# Patient Record
Sex: MALE | Race: WHITE | NOT HISPANIC OR LATINO | Employment: FULL TIME | URBAN - METROPOLITAN AREA
[De-identification: names, ages, dates, MRNs, and addresses within clinical notes are randomized per-mention and may not be internally consistent; named-entity substitution may affect disease eponyms.]

---

## 2021-01-26 ENCOUNTER — IMMUNIZATION (OUTPATIENT)
Dept: PEDIATRICS | Facility: CLINIC | Age: 31
End: 2021-01-26
Payer: COMMERCIAL

## 2021-01-26 PROCEDURE — 91300 PR COVID VAC PFIZER DIL RECON 30 MCG/0.3 ML IM: CPT

## 2021-01-26 PROCEDURE — 0001A PR COVID VAC PFIZER DIL RECON 30 MCG/0.3 ML IM: CPT

## 2021-02-16 ENCOUNTER — IMMUNIZATION (OUTPATIENT)
Dept: PEDIATRICS | Facility: CLINIC | Age: 31
End: 2021-02-16
Attending: INTERNAL MEDICINE
Payer: COMMERCIAL

## 2021-02-16 PROCEDURE — 0002A PR COVID VAC PFIZER DIL RECON 30 MCG/0.3 ML IM: CPT

## 2021-02-16 PROCEDURE — 91300 PR COVID VAC PFIZER DIL RECON 30 MCG/0.3 ML IM: CPT

## 2021-03-07 ENCOUNTER — HEALTH MAINTENANCE LETTER (OUTPATIENT)
Age: 31
End: 2021-03-07

## 2021-10-11 ENCOUNTER — HEALTH MAINTENANCE LETTER (OUTPATIENT)
Age: 31
End: 2021-10-11

## 2022-03-27 ENCOUNTER — HEALTH MAINTENANCE LETTER (OUTPATIENT)
Age: 32
End: 2022-03-27

## 2022-09-25 ENCOUNTER — HEALTH MAINTENANCE LETTER (OUTPATIENT)
Age: 32
End: 2022-09-25

## 2022-10-24 ENCOUNTER — IMMUNIZATION (OUTPATIENT)
Dept: FAMILY MEDICINE | Facility: CLINIC | Age: 32
End: 2022-10-24
Payer: COMMERCIAL

## 2022-10-24 DIAGNOSIS — Z23 HIGH PRIORITY FOR 2019-NCOV VACCINE: Primary | ICD-10-CM

## 2022-10-24 PROCEDURE — 0124A COVID-19,PF,PFIZER BOOSTER BIVALENT: CPT

## 2022-10-24 PROCEDURE — 91312 COVID-19,PF,PFIZER BOOSTER BIVALENT: CPT

## 2022-11-17 ENCOUNTER — TELEPHONE (OUTPATIENT)
Dept: FAMILY MEDICINE | Facility: CLINIC | Age: 32
End: 2022-11-17

## 2022-11-17 NOTE — TELEPHONE ENCOUNTER
Hi  Pt has appt scheduled with you on Monday for office visit-scheduled through [facility], but in the note he put that he wants to do a physical. Are you okay with me changing the type to physical even though it is a 20 minute appt? Let me know, thanks!

## 2022-11-18 ASSESSMENT — ENCOUNTER SYMPTOMS
NERVOUS/ANXIOUS: 0
DIARRHEA: 0
JOINT SWELLING: 0
SORE THROAT: 0
DYSURIA: 1
ABDOMINAL PAIN: 0
CHILLS: 0
HEADACHES: 0
DIZZINESS: 0
PALPITATIONS: 0
CONSTIPATION: 0
HEMATOCHEZIA: 0
HEARTBURN: 0
SHORTNESS OF BREATH: 0
PARESTHESIAS: 0
HEMATURIA: 0
EYE PAIN: 0
FEVER: 0
FREQUENCY: 0
COUGH: 0
MYALGIAS: 0
WEAKNESS: 0
ARTHRALGIAS: 0
NAUSEA: 0

## 2022-11-21 ENCOUNTER — OFFICE VISIT (OUTPATIENT)
Dept: FAMILY MEDICINE | Facility: CLINIC | Age: 32
End: 2022-11-21
Payer: COMMERCIAL

## 2022-11-21 VITALS
DIASTOLIC BLOOD PRESSURE: 79 MMHG | HEART RATE: 66 BPM | OXYGEN SATURATION: 99 % | SYSTOLIC BLOOD PRESSURE: 119 MMHG | WEIGHT: 209.4 LBS | RESPIRATION RATE: 13 BRPM | TEMPERATURE: 97.9 F | HEIGHT: 70 IN | BODY MASS INDEX: 29.98 KG/M2

## 2022-11-21 DIAGNOSIS — Z13.220 SCREENING CHOLESTEROL LEVEL: ICD-10-CM

## 2022-11-21 DIAGNOSIS — Z13.1 DIABETES MELLITUS SCREENING: ICD-10-CM

## 2022-11-21 DIAGNOSIS — L80 VITILIGO: ICD-10-CM

## 2022-11-21 DIAGNOSIS — N48.1 BALANITIS: ICD-10-CM

## 2022-11-21 DIAGNOSIS — Z00.00 ROUTINE GENERAL MEDICAL EXAMINATION AT A HEALTH CARE FACILITY: Primary | ICD-10-CM

## 2022-11-21 PROCEDURE — 99385 PREV VISIT NEW AGE 18-39: CPT | Performed by: FAMILY MEDICINE

## 2022-11-21 PROCEDURE — 36415 COLL VENOUS BLD VENIPUNCTURE: CPT | Performed by: FAMILY MEDICINE

## 2022-11-21 PROCEDURE — 82947 ASSAY GLUCOSE BLOOD QUANT: CPT | Performed by: FAMILY MEDICINE

## 2022-11-21 PROCEDURE — 80061 LIPID PANEL: CPT | Performed by: FAMILY MEDICINE

## 2022-11-21 RX ORDER — NYSTATIN AND TRIAMCINOLONE ACETONIDE 100000; 1 [USP'U]/G; MG/G
OINTMENT TOPICAL 2 TIMES DAILY
Qty: 15 G | Refills: 0 | Status: SHIPPED | OUTPATIENT
Start: 2022-11-21 | End: 2024-04-17

## 2022-11-21 ASSESSMENT — ENCOUNTER SYMPTOMS
EYE PAIN: 0
ARTHRALGIAS: 0
SHORTNESS OF BREATH: 0
MYALGIAS: 0
CHILLS: 0
FEVER: 0
CONSTIPATION: 0
DIARRHEA: 0
SORE THROAT: 0
PALPITATIONS: 0
PARESTHESIAS: 0
HEMATOCHEZIA: 0
COUGH: 0
HEMATURIA: 0
ABDOMINAL PAIN: 0
DYSURIA: 1
FREQUENCY: 0
DIZZINESS: 0
NAUSEA: 0
HEADACHES: 0
WEAKNESS: 0
NERVOUS/ANXIOUS: 0
HEARTBURN: 0
JOINT SWELLING: 0

## 2022-11-21 ASSESSMENT — PAIN SCALES - GENERAL: PAINLEVEL: NO PAIN (0)

## 2022-11-21 NOTE — PROGRESS NOTES
SUBJECTIVE:   CC: Ray is an 32 year old who presents for preventative health visit.     Patient has been advised of split billing requirements and indicates understanding: Yes  Healthy Habits:     Getting at least 3 servings of Calcium per day:  Yes    Bi-annual eye exam:  Yes    Dental care twice a year:  NO    Sleep apnea or symptoms of sleep apnea:  None    Diet:  Regular (no restrictions)    Frequency of exercise:  6-7 days/week    Duration of exercise:  45-60 minutes    Taking medications regularly:  Not Applicable    Medication side effects:  Not applicable    PHQ-2 Total Score: 0    Additional concerns today:  No        Today's PHQ-2 Score:   PHQ-2 ( 1999 Pfizer) 11/18/2022   Q1: Little interest or pleasure in doing things 0   Q2: Feeling down, depressed or hopeless 0   PHQ-2 Score 0   Q1: Little interest or pleasure in doing things Not at all   Q2: Feeling down, depressed or hopeless Not at all   PHQ-2 Score 0       Have you ever done Advance Care Planning? (For example, a Health Directive, POLST, or a discussion with a medical provider or your loved ones about your wishes): No, advance care planning information given to patient to review.  Patient declined advance care planning discussion at this time.    Social History     Tobacco Use     Smoking status: Every Day     Types: Other     Smokeless tobacco: Never   Substance Use Topics     Alcohol use: Yes     Comment: 1 -2 drinks a week     If you drink alcohol do you typically have >3 drinks per day or >7 drinks per week? No    Alcohol Use 11/21/2022   Prescreen: >3 drinks/day or >7 drinks/week? -   Prescreen: >3 drinks/day or >7 drinks/week? No       Last PSA: No results found for: PSA    Reviewed orders with patient. Reviewed health maintenance and updated orders accordingly - Yes  Lab work is in process  Labs reviewed in EPIC  BP Readings from Last 3 Encounters:   11/21/22 119/79    Wt Readings from Last 3 Encounters:   11/21/22 95 kg (209 lb 6.4 oz)     "                Reviewed and updated as needed this visit by clinical staff   Tobacco  Allergies  Meds  Problems  Med Hx  Surg Hx  Fam Hx          Reviewed and updated as needed this visit by Provider     Meds  Problems  Med Hx  Surg Hx  Fam Hx         Here today to establish care and for routine checkup.  He notes a few weeks of what he thinks might be balanitis.  Developed some irritation of the skin around the glans of his penis.  Using some over-the-counter antifungal cream and it seems to have kept things in check but not necessarily improving it.  No urinary or ejaculatory symptoms.  Denies any other constitutional symptoms such as polyuria or polydipsia.    Review of Systems   Constitutional: Negative for chills and fever.   HENT: Negative for congestion, ear pain, hearing loss and sore throat.    Eyes: Negative for pain and visual disturbance.   Respiratory: Negative for cough and shortness of breath.    Cardiovascular: Negative for chest pain, palpitations and peripheral edema.   Gastrointestinal: Negative for abdominal pain, constipation, diarrhea, heartburn, hematochezia and nausea.   Genitourinary: Positive for dysuria and genital sores. Negative for frequency, hematuria, impotence, penile discharge and urgency.   Musculoskeletal: Negative for arthralgias, joint swelling and myalgias.   Skin: Negative for rash.   Neurological: Negative for dizziness, weakness, headaches and paresthesias.   Psychiatric/Behavioral: Negative for mood changes. The patient is not nervous/anxious.        OBJECTIVE:   /79   Pulse 66   Temp 97.9  F (36.6  C) (Oral)   Resp 13   Ht 1.778 m (5' 10\")   Wt 95 kg (209 lb 6.4 oz)   SpO2 99%   BMI 30.05 kg/m      Physical Exam  GENERAL: healthy, alert and no distress  EYES: Eyes grossly normal to inspection, PERRL and conjunctivae and sclerae normal  HENT: ear canals and TM's normal, nose and mouth without ulcers or lesions  NECK: no adenopathy, no asymmetry, " masses, or scars and thyroid normal to palpation  RESP: lungs clear to auscultation - no rales, rhonchi or wheezes  CV: regular rate and rhythm, normal S1 S2, no S3 or S4, no murmur, click or rub, no peripheral edema and peripheral pulses strong  ABDOMEN: soft, nontender, no hepatosplenomegaly, no masses and bowel sounds normal   (male): Some patches of hypopigmentation scattered around the distal foreskin and glans.  But there is a sense of generalized irritation more prominent on the underside of the distal foreskin  MS: no gross musculoskeletal defects noted, no edema  SKIN: no suspicious lesions or rashes  NEURO: Normal strength and tone, mentation intact and speech normal  PSYCH: mentation appears normal, affect normal/bright    Diagnostic Test Results:  Labs reviewed in Epic    ASSESSMENT/PLAN:   (Z00.00) Routine general medical examination at a health care facility  (primary encounter diagnosis)  Comment: Routine health maintenance otherwise up-to-date  Plan:     (N48.1) Balanitis  Comment: Short-term trial of combination medicine but I would not want to use this long-term.  Contact me in 1 to 2 weeks with progress  Plan: nystatin-triamcinolone (MYCOLOG) 237907-7.1         UNIT/GM-% external ointment            (L80) Vitiligo  Comment: Stable.  Dermatology if needed.  Plan:     (Z13.220) Screening cholesterol level  Comment:   Plan: Lipid panel reflex to direct LDL Non-fasting            (Z13.1) Diabetes mellitus screening  Comment:   Plan: Glucose              Patient has been advised of split billing requirements and indicates understanding: Yes      COUNSELING:   Reviewed preventive health counseling, as reflected in patient instructions       Regular exercise       Healthy diet/nutrition       Vision screening        He reports that he has been smoking other. He has never used smokeless tobacco.  Nicotine/Tobacco Cessation Plan:   Information offered: Patient not interested at this time            Olivia  Kp Lara MD  [facility]

## 2022-11-22 LAB
CHOLEST SERPL-MCNC: 251 MG/DL
FASTING STATUS PATIENT QL REPORTED: NO
FASTING STATUS PATIENT QL REPORTED: NO
GLUCOSE BLD-MCNC: 112 MG/DL (ref 70–99)
HDLC SERPL-MCNC: 37 MG/DL
LDLC SERPL CALC-MCNC: 165 MG/DL
NONHDLC SERPL-MCNC: 214 MG/DL
TRIGL SERPL-MCNC: 246 MG/DL

## 2022-11-23 NOTE — RESULT ENCOUNTER NOTE
Ray,  Your sugar and cholesterol are mildly elevated - not yet to the point of needing any medication, but I do think we should try to bring them down through diet, exercise, weight loss, etc.  I suggest a diet high in protein and plants (fruits, veggies) and low in simple carbohydrates, combined with an exercise program including both cardio and weight training.  We can recheck on this annually.    IVÁN Lara MD

## 2023-10-23 ENCOUNTER — PATIENT OUTREACH (OUTPATIENT)
Dept: CARE COORDINATION | Facility: CLINIC | Age: 33
End: 2023-10-23
Payer: COMMERCIAL

## 2023-11-06 ENCOUNTER — PATIENT OUTREACH (OUTPATIENT)
Dept: CARE COORDINATION | Facility: CLINIC | Age: 33
End: 2023-11-06
Payer: COMMERCIAL

## 2023-12-23 ENCOUNTER — HEALTH MAINTENANCE LETTER (OUTPATIENT)
Age: 33
End: 2023-12-23

## 2024-04-15 SDOH — HEALTH STABILITY: PHYSICAL HEALTH: ON AVERAGE, HOW MANY DAYS PER WEEK DO YOU ENGAGE IN MODERATE TO STRENUOUS EXERCISE (LIKE A BRISK WALK)?: 7 DAYS

## 2024-04-15 SDOH — HEALTH STABILITY: PHYSICAL HEALTH: ON AVERAGE, HOW MANY MINUTES DO YOU ENGAGE IN EXERCISE AT THIS LEVEL?: 40 MIN

## 2024-04-15 ASSESSMENT — SOCIAL DETERMINANTS OF HEALTH (SDOH): HOW OFTEN DO YOU GET TOGETHER WITH FRIENDS OR RELATIVES?: TWICE A WEEK

## 2024-04-17 ENCOUNTER — OFFICE VISIT (OUTPATIENT)
Dept: FAMILY MEDICINE | Facility: CLINIC | Age: 34
End: 2024-04-17
Payer: COMMERCIAL

## 2024-04-17 VITALS
SYSTOLIC BLOOD PRESSURE: 132 MMHG | WEIGHT: 205 LBS | HEIGHT: 71 IN | OXYGEN SATURATION: 98 % | HEART RATE: 65 BPM | TEMPERATURE: 96.9 F | BODY MASS INDEX: 28.7 KG/M2 | DIASTOLIC BLOOD PRESSURE: 82 MMHG

## 2024-04-17 DIAGNOSIS — E78.5 HYPERLIPIDEMIA WITH TARGET LDL LESS THAN 100: ICD-10-CM

## 2024-04-17 DIAGNOSIS — Z00.00 PREVENTATIVE HEALTH CARE: Primary | ICD-10-CM

## 2024-04-17 DIAGNOSIS — D22.9 BENIGN SKIN MOLE: ICD-10-CM

## 2024-04-17 LAB — HBA1C MFR BLD: 5.1 % (ref 0–5.6)

## 2024-04-17 PROCEDURE — 83036 HEMOGLOBIN GLYCOSYLATED A1C: CPT

## 2024-04-17 PROCEDURE — 90715 TDAP VACCINE 7 YRS/> IM: CPT

## 2024-04-17 PROCEDURE — 90471 IMMUNIZATION ADMIN: CPT

## 2024-04-17 PROCEDURE — 80061 LIPID PANEL: CPT

## 2024-04-17 PROCEDURE — 84460 ALANINE AMINO (ALT) (SGPT): CPT

## 2024-04-17 PROCEDURE — 91320 SARSCV2 VAC 30MCG TRS-SUC IM: CPT

## 2024-04-17 PROCEDURE — 99395 PREV VISIT EST AGE 18-39: CPT | Mod: 25

## 2024-04-17 PROCEDURE — 36415 COLL VENOUS BLD VENIPUNCTURE: CPT

## 2024-04-17 PROCEDURE — 84443 ASSAY THYROID STIM HORMONE: CPT

## 2024-04-17 PROCEDURE — 90472 IMMUNIZATION ADMIN EACH ADD: CPT

## 2024-04-17 PROCEDURE — 80048 BASIC METABOLIC PNL TOTAL CA: CPT

## 2024-04-17 PROCEDURE — 90480 ADMN SARSCOV2 VAC 1/ONLY CMP: CPT

## 2024-04-17 PROCEDURE — 90677 PCV20 VACCINE IM: CPT

## 2024-04-17 NOTE — PROGRESS NOTES
"Preventive Care Visit  [facility] REYNA NOWAK Juju, ARELIS CNP, Nurse Practitioner Primary Care  Apr 17, 2024      Assessment & Plan     Preventative health care  TDAP, COVID and PCV 20 given. Screening labs ordered  - Lipid panel reflex to direct LDL Fasting; Future  - Basic metabolic panel  (Ca, Cl, CO2, Creat, Gluc, K, Na, BUN); Future  - Hemoglobin A1c; Future  - Lipid panel reflex to direct LDL Fasting  - Basic metabolic panel  (Ca, Cl, CO2, Creat, Gluc, K, Na, BUN)  - Hemoglobin A1c    Benign skin mole  Mole to left abdomen <0.5cm in size. Slight Asymmetry but no other concerning features. Appears to be similar to other moles on his body. Discussed possible removal but at this time will defer.     Hyperlipidemia with target LDL less than 100  Elevated cholesterol levels last year. Reports he is fasting. Will recheck lipids today, states he has improved his diet.   - Lipid panel reflex to direct LDL Fasting; Future              BMI  Estimated body mass index is 29 kg/m  as calculated from the following:    Height as of this encounter: 1.791 m (5' 10.5\").    Weight as of this encounter: 93 kg (205 lb).       Counseling  Appropriate preventive services were discussed with this patient, including applicable screening as appropriate for fall prevention, nutrition, physical activity, Tobacco-use cessation, weight loss and cognition.  Checklist reviewing preventive services available has been given to the patient.  Reviewed patient's diet, addressing concerns and/or questions.   The patient was instructed to see the dentist every 6 months.   He is at risk for psychosocial distress and has been provided with information to reduce risk.           Maryann Bell is a 34 year old, presenting for the following:  Physical and Mole        4/17/2024     8:43 AM   Additional Questions   Roomed by jayesh Hansen    Health Care Directive  Patient does not have a Health Care Directive or Living " Will: Discussed advance care planning with patient; information given to patient to review.    HPI  Has his own practice, works as a therapist.   Has two children.    Skin Lesion  Onset/Duration: 9months-1year ago  Description  Location: left upper quad stomach  Color: brown and black  Border description: flat, round  Character: round  Itching: no  Bleeding:  No  Intensity:    Progression of Symptoms:  same  Accompanying signs and symptoms:   Bleeding: No  Scaling: No  Excessive sun exposure/tanning: No- does have vitalago  Sunscreen used: YES  History:           Any previous history of skin cancer: No  Any family history of melanoma: No  Previous episodes of similar lesion: No  Precipitating or alleviating factors:   Therapies tried and outcome: none        4/15/2024   General Health   How would you rate your overall physical health? Good   Feel stress (tense, anxious, or unable to sleep) Only a little   (!) STRESS CONCERN      4/15/2024   Nutrition   Three or more servings of calcium each day? Yes   Diet: Regular (no restrictions)   How many servings of fruit and vegetables per day? (!) 2-3   How many sweetened beverages each day? 0-1   Reduces cholesterol levels       4/15/2024   Exercise   Days per week of moderate/strenous exercise 7 days   Average minutes spent exercising at this level 40 min   Uses peloton       4/15/2024   Social Factors   Frequency of gathering with friends or relatives Twice a week   Worry food won't last until get money to buy more No   Food not last or not have enough money for food? No   Do you have housing?  Yes   Are you worried about losing your housing? No   Lack of transportation? No   Unable to get utilities (heat,electricity)? No         4/15/2024   Dental   Dentist two times every year? (!) NO   Went to the dentist yesterday. States it was the first time he has been since the pandemic.       4/15/2024   TB Screening   Were you born outside of the [place]? No     Today's PHQ-2 Score:  "      4/17/2024     8:39 AM   PHQ-2 ( 1999 Pfizer)   Q1: Little interest or pleasure in doing things 0   Q2: Feeling down, depressed or hopeless 0   PHQ-2 Score 0   Q1: Little interest or pleasure in doing things Not at all   Q2: Feeling down, depressed or hopeless Not at all   PHQ-2 Score 0         4/15/2024   Substance Use   Alcohol more than 3/day or more than 7/wk No   Do you use any other substances recreationally? No     Social History     Tobacco Use    Smoking status: Former     Current packs/day: 0.50     Average packs/day: 0.5 packs/day for 5.0 years (2.5 ttl pk-yrs)     Types: Other, Cigarettes    Smokeless tobacco: Never   Vaping Use    Vaping status: Every Day    Substances: Nicotine    Devices: Refillable tank   Substance Use Topics    Alcohol use: Yes     Comment: 1 -2 drinks a week    Drug use: Never         4/15/2024   STI Screening   New sexual partner(s) since last STI/HIV test? No         4/15/2024   Contraception/Family Planning   Questions about contraception or family planning No        Reviewed and updated as needed this visit by Provider   Tobacco  Allergies  Meds  Problems  Med Hx  Surg Hx  Fam Hx               Objective    Exam  /82   Pulse 65   Temp 96.9  F (36.1  C) (Temporal)   Ht 1.791 m (5' 10.5\")   Wt 93 kg (205 lb)   SpO2 98%   BMI 29.00 kg/m     Estimated body mass index is 29 kg/m  as calculated from the following:    Height as of this encounter: 1.791 m (5' 10.5\").    Weight as of this encounter: 93 kg (205 lb).    Physical Exam  GENERAL: alert and no distress  EYES: Eyes grossly normal to inspection, PERRL and conjunctivae and sclerae normal  HENT: ear canals and TM's normal, nose and mouth without ulcers or lesions  NECK: no adenopathy, no asymmetry, masses, or scars  RESP: lungs clear to auscultation - no rales, rhonchi or wheezes  CV: regular rate and rhythm, normal S1 S2, no S3 or S4, no murmur, click or rub, no peripheral edema  ABDOMEN: soft, nontender, " no hepatosplenomegaly, no masses and bowel sounds normal  MS: no gross musculoskeletal defects noted, no edema  SKIN: left abdominal, left side and mid back mole - all less then 0.5cm, and cyst to back of neck left.   NEURO: Normal strength and tone, mentation intact and speech normal  PSYCH: mentation appears normal, affect normal/bright        Signed Electronically by: ARELIS Castanon CNP

## 2024-04-18 DIAGNOSIS — E78.5 HYPERLIPIDEMIA WITH TARGET LDL LESS THAN 100: Primary | ICD-10-CM

## 2024-04-18 LAB
ALT SERPL W P-5'-P-CCNC: 20 U/L (ref 0–70)
ANION GAP SERPL CALCULATED.3IONS-SCNC: 13 MMOL/L (ref 7–15)
BUN SERPL-MCNC: 17.4 MG/DL (ref 6–20)
CALCIUM SERPL-MCNC: 9.4 MG/DL (ref 8.6–10)
CHLORIDE SERPL-SCNC: 104 MMOL/L (ref 98–107)
CHOLEST SERPL-MCNC: 252 MG/DL
CREAT SERPL-MCNC: 1.01 MG/DL (ref 0.67–1.17)
DEPRECATED HCO3 PLAS-SCNC: 23 MMOL/L (ref 22–29)
EGFRCR SERPLBLD CKD-EPI 2021: >90 ML/MIN/1.73M2
FASTING STATUS PATIENT QL REPORTED: YES
GLUCOSE SERPL-MCNC: 97 MG/DL (ref 70–99)
HDLC SERPL-MCNC: 39 MG/DL
LDLC SERPL CALC-MCNC: 181 MG/DL
NONHDLC SERPL-MCNC: 213 MG/DL
POTASSIUM SERPL-SCNC: 4 MMOL/L (ref 3.4–5.3)
SODIUM SERPL-SCNC: 140 MMOL/L (ref 135–145)
TRIGL SERPL-MCNC: 159 MG/DL
TSH SERPL DL<=0.005 MIU/L-ACNC: 1.45 UIU/ML (ref 0.3–4.2)

## 2024-04-22 NOTE — RESULT ENCOUNTER NOTE
Talon Bell,     Your Triglyceride levels have improved since last year which is great but your cholesterol level continues to be elevated and your LDL or bad cholesterol is slightly elevated from last year. Continue to work on your diet and exercise and we will recheck this again in one year.     All other labs are normal!     Feel free to contact me via [employer] or call the clinic at [phone].     Sincerely,  Leidy Matute DNP, FNP-C

## 2024-05-29 ENCOUNTER — MYC MEDICAL ADVICE (OUTPATIENT)
Dept: FAMILY MEDICINE | Facility: CLINIC | Age: 34
End: 2024-05-29
Payer: COMMERCIAL

## 2024-05-31 ENCOUNTER — VIRTUAL VISIT (OUTPATIENT)
Dept: FAMILY MEDICINE | Facility: CLINIC | Age: 34
End: 2024-05-31
Payer: COMMERCIAL

## 2024-05-31 DIAGNOSIS — N48.1 BALANITIS: Primary | ICD-10-CM

## 2024-05-31 PROCEDURE — 99213 OFFICE O/P EST LOW 20 MIN: CPT | Mod: 95 | Performed by: FAMILY MEDICINE

## 2024-05-31 RX ORDER — NYSTATIN AND TRIAMCINOLONE ACETONIDE 100000; 1 [USP'U]/G; MG/G
OINTMENT TOPICAL 2 TIMES DAILY
Qty: 15 G | Refills: 0 | Status: SHIPPED | OUTPATIENT
Start: 2024-05-31 | End: 2024-06-04

## 2024-05-31 NOTE — PROGRESS NOTES
Answers submitted by the patient for this visit:  General Questionnaire (Submitted on 5/31/2024)  Chief Complaint: Chronic problems general questions HPI Form  What is the reason for your visit today? : fungal infection  How many servings of fruits and vegetables do you eat daily?: 2-3  On average, how many sweetened beverages do you drink each day (Examples: soda, juice, sweet tea, etc.  Do NOT count diet or artificially sweetened beverages)?: 0  How many minutes a day do you exercise enough to make your heart beat faster?: 30 to 60  How many days a week do you exercise enough to make your heart beat faster?: 5  How many days per week do you miss taking your medication?: 0

## 2024-05-31 NOTE — PROGRESS NOTES
"Ray is a 34 year old who is being evaluated via a billable video visit.    How would you like to obtain your AVS? MyChart  If the video visit is dropped, the invitation should be resent by: Text to cell phone: [phone]  Will anyone else be joining your video visit? No      Assessment & Plan     Balanitis  When I had seen the patient back in 2022 he had some irritation to the glans of his penis consistent with balanitis.  It responded very well to the use of Mycolog.  Had not been an issue until just recently where he started noticing a bit of irritation and burning.  Not urinary per se.  So we will get him back on the cream right away expecting quick resolution of the issue.  If not I would like him to contact me to either be seen or consider referral.  - nystatin-triamcinolone (MYCOLOG) 540813-7.1 UNIT/GM-% external ointment; Apply topically 2 times daily      BMI  Estimated body mass index is 29 kg/m  as calculated from the following:    Height as of 4/17/24: 1.791 m (5' 10.5\").    Weight as of 4/17/24: 93 kg (205 lb).         See Patient Instructions    Subjective   Ray is a 34 year old, presenting for the following health issues:  No chief complaint on file.        4/17/2024     8:43 AM   Additional Questions   Roomed by jayesh BRANDT       Video visit with patient today to follow-up on balanitis      Review of Systems  Constitutional, HEENT, cardiovascular, pulmonary, gi and gu systems are negative, except as otherwise noted.      Objective           Vitals:  No vitals were obtained today due to virtual visit.    Physical Exam   GENERAL: alert and no distress  EYES: Eyes grossly normal to inspection.  No discharge or erythema, or obvious scleral/conjunctival abnormalities.  RESP: No audible wheeze, cough, or visible cyanosis.    SKIN: Visible skin clear. No significant rash, abnormal pigmentation or lesions.  NEURO: Cranial nerves grossly intact.  Mentation and speech appropriate for age.  PSYCH: " Appropriate affect, tone, and pace of words    Past labs reviewed with the patient.       Video-Visit Details    Type of service:  Video Visit   Originating Location (pt. Location): Home    Distant Location (provider location):  Off-site  Platform used for Video Visit: [facility]  Signed Electronically by: Olivia Lara MD

## 2024-06-04 ENCOUNTER — TELEPHONE (OUTPATIENT)
Dept: FAMILY MEDICINE | Facility: CLINIC | Age: 34
End: 2024-06-04
Payer: COMMERCIAL

## 2024-06-04 DIAGNOSIS — N48.1 BALANITIS: ICD-10-CM

## 2024-06-04 RX ORDER — NYSTATIN 100000 U/G
OINTMENT TOPICAL 2 TIMES DAILY
Qty: 15 G | Refills: 0 | Status: SHIPPED | OUTPATIENT
Start: 2024-06-04 | End: 2024-07-16

## 2024-06-04 RX ORDER — TRIAMCINOLONE ACETONIDE 1 MG/G
OINTMENT TOPICAL 2 TIMES DAILY
Qty: 15 G | Refills: 0 | Status: SHIPPED | OUTPATIENT
Start: 2024-06-04 | End: 2024-07-16

## 2024-06-04 NOTE — TELEPHONE ENCOUNTER
Pharmacy Comments: Missing/Illegible info on Rx - Further Clarification: Needs PA or please send as two separate products.

## 2024-07-09 ENCOUNTER — MYC MEDICAL ADVICE (OUTPATIENT)
Dept: FAMILY MEDICINE | Facility: CLINIC | Age: 34
End: 2024-07-09
Payer: COMMERCIAL

## 2024-07-09 DIAGNOSIS — N48.1 BALANITIS: Primary | ICD-10-CM

## 2024-07-09 NOTE — TELEPHONE ENCOUNTER
Looks like this is a referral for Dermatology. Someone from that dept should be reaching out to pt

## 2024-07-09 NOTE — TELEPHONE ENCOUNTER
Routing to team coordinators to schedule patient.    Tesfaye Jimenez RN  [facility]

## 2024-07-09 NOTE — TELEPHONE ENCOUNTER
Patient last seen a little over a month ago. Routing to provider to review and advise - Need another appointment?    Tesfaye Jimenez RN  [facility]

## 2024-07-16 ENCOUNTER — MYC REFILL (OUTPATIENT)
Dept: FAMILY MEDICINE | Facility: CLINIC | Age: 34
End: 2024-07-16
Payer: COMMERCIAL

## 2024-07-16 DIAGNOSIS — N48.1 BALANITIS: ICD-10-CM

## 2024-07-16 RX ORDER — NYSTATIN 100000 U/G
OINTMENT TOPICAL 2 TIMES DAILY
Qty: 15 G | Refills: 0 | Status: SHIPPED | OUTPATIENT
Start: 2024-07-16

## 2024-07-16 RX ORDER — TRIAMCINOLONE ACETONIDE 1 MG/G
OINTMENT TOPICAL 2 TIMES DAILY
Qty: 15 G | Refills: 0 | Status: SHIPPED | OUTPATIENT
Start: 2024-07-16

## 2024-07-30 ENCOUNTER — MYC MEDICAL ADVICE (OUTPATIENT)
Dept: FAMILY MEDICINE | Facility: CLINIC | Age: 34
End: 2024-07-30
Payer: COMMERCIAL

## 2024-08-05 ENCOUNTER — OFFICE VISIT (OUTPATIENT)
Dept: DERMATOLOGY | Facility: CLINIC | Age: 34
End: 2024-08-05
Attending: FAMILY MEDICINE
Payer: COMMERCIAL

## 2024-08-05 DIAGNOSIS — L80 VITILIGO: Primary | ICD-10-CM

## 2024-08-05 PROCEDURE — 99242 OFF/OP CONSLTJ NEW/EST SF 20: CPT | Performed by: DERMATOLOGY

## 2024-08-05 NOTE — PROGRESS NOTES
Leonardo Pace , a 34 year old year old male patient, I was asked to see by Dr. Lara for rash on penis and pain with urination.  HE is using nystatin and TAC. HE denies any skin itching or issues.  Patient has no other skin complaints today.  Remainder of the HPI, Meds, PMH, Allergies, FH, and SH was reviewed in chart.    History reviewed. No pertinent past medical history.    History reviewed. No pertinent surgical history.     Family History   Problem Relation Age of Onset    Cerebrovascular Disease Mother     Cerebrovascular Disease Father         Due to an accident       Social History     Socioeconomic History    Marital status:      Spouse name: Not on file    Number of children: Not on file    Years of education: Not on file    Highest education level: Not on file   Occupational History    Not on file   Tobacco Use    Smoking status: Every Day     Current packs/day: 0.50     Average packs/day: 0.5 packs/day for 5.0 years (2.5 ttl pk-yrs)     Types: Other, Cigarettes    Smokeless tobacco: Never   Vaping Use    Vaping status: Every Day    Substances: Nicotine    Devices: Refillable tank   Substance and Sexual Activity    Alcohol use: Yes     Comment: 1 -2 drinks a week    Drug use: Never    Sexual activity: Yes     Partners: Male     Birth control/protection: I.U.D.   Other Topics Concern    Parent/sibling w/ CABG, MI or angioplasty before 65F 55M? No   Social History Narrative    Not on file     Social Determinants of Health     Financial Resource Strain: Low Risk  (4/15/2024)    Financial Resource Strain     Within the past 12 months, have you or your family members you live with been unable to get utilities (heat, electricity) when it was really needed?: No   Food Insecurity: Low Risk  (4/15/2024)    Food Insecurity     Within the past 12 months, did you worry that your food would run out before you got money to buy more?: No     Within the past 12 months, did the food you bought just not  last and you didn t have money to get more?: No   Transportation Needs: Low Risk  (4/15/2024)    Transportation Needs     Within the past 12 months, has lack of transportation kept you from medical appointments, getting your medicines, non-medical meetings or appointments, work, or from getting things that you need?: No   Physical Activity: Sufficiently Active (4/15/2024)    Exercise Vital Sign     Days of Exercise per Week: 7 days     Minutes of Exercise per Session: 40 min   Stress: No Stress Concern Present (4/15/2024)    [employer] [place] [employer] - Occupational Stress Questionnaire     Feeling of Stress : Only a little   Social Connections: Unknown (4/15/2024)    Social Connection and Isolation Panel [NHANES]     Frequency of Communication with Friends and Family: Not on file     Frequency of Social Gatherings with Friends and Family: Twice a week     Attends [employer] Services: Not on file     Active Member of Clubs or Organizations: Not on file     Attends Club or Organization Meetings: Not on file     Marital Status: Not on file   Interpersonal Safety: Low Risk  (4/17/2024)    Interpersonal Safety     Do you feel physically and emotionally safe where you currently live?: Yes     Within the past 12 months, have you been hit, slapped, kicked or otherwise physically hurt by someone?: No     Within the past 12 months, have you been humiliated or emotionally abused in other ways by your partner or ex-partner?: No   Housing Stability: Low Risk  (4/15/2024)    Housing Stability     Do you have housing? : Yes     Are you worried about losing your housing?: No       Outpatient Encounter Medications as of 8/5/2024   Medication Sig Dispense Refill    nystatin (MYCOSTATIN) 837276 UNIT/GM external ointment Apply topically 2 times daily 15 g 0    triamcinolone (KENALOG) 0.1 % external ointment Apply topically 2 times daily 15 g 0     No facility-administered encounter medications on file as of 8/5/2024.              Review Of Systems  Skin: As above  Eyes: negative  Ears/Nose/Throat: negative  Respiratory: No shortness of breath, dyspnea on exertion, cough, or hemoptysis  Cardiovascular: negative  Gastrointestinal: negative  Genitourinary: negative  Musculoskeletal: negative  Neurologic: negative  Psychiatric: negative  Hematologic/Lymphatic/Immunologic: negative  Endocrine: negative      O:   NAD, WDWN, Alert & Oriented, Mood & Affect wnl, Vitals stable   General appearance salazar ii   Vitals stable   Alert, oriented and in no acute distress   Depigmented patches on hands and penis   No visible rash on penis      Eyes: Conjunctivae/lids:Normal     ENT: Lips, mucosa: normal    MSK:Normal    Cardiovascular: peripheral edema none    Pulm: Breathing Normal    Neuro/Psych: Orientation:Normal; Mood/Affect:Normal      A/P:  Vitiligo, pain with urination   Options for vitiligo discussed with patient   Pain with urination I would encourage follow up with Urology  Skin care discussed with patient   If rash occurs on penis mychart us   It was a pleasure speaking to Leonardo Pace today.  Previous clinic  notes and pertinent laboratory tests were reviewed prior to Leonardo Pace's visit.  Skin care regimen reviewed with patient: Eliminate harsh soaps, i.e. Dial, zest, irsih spring; Mild soaps such as Cetaphil or Dove sensitive skin, avoid hot or cold showers, aggressive use of emollients including vanicream, cetaphil or cerave discussed with patient.

## 2024-08-05 NOTE — LETTER
8/5/2024      Leonardo Pace  [address] [facility] [place] [address]      Dear Colleague,    Thank you for referring your patient, Leonardo Pace, to the [facility]. Please see a copy of my visit note below.    Leonardo Pace , a 34 year old year old male patient, I was asked to see by Dr. Lara for rash on penis and pain with urination.  HE is using nystatin and TAC. HE denies any skin itching or issues.  Patient has no other skin complaints today.  Remainder of the HPI, Meds, PMH, Allergies, FH, and SH was reviewed in chart.    History reviewed. No pertinent past medical history.    History reviewed. No pertinent surgical history.     Family History   Problem Relation Age of Onset     Cerebrovascular Disease Mother      Cerebrovascular Disease Father         Due to an accident       Social History     Socioeconomic History     Marital status:      Spouse name: Not on file     Number of children: Not on file     Years of education: Not on file     Highest education level: Not on file   Occupational History     Not on file   Tobacco Use     Smoking status: Every Day     Current packs/day: 0.50     Average packs/day: 0.5 packs/day for 5.0 years (2.5 ttl pk-yrs)     Types: Other, Cigarettes     Smokeless tobacco: Never   Vaping Use     Vaping status: Every Day     Substances: Nicotine     Devices: Refillable tank   Substance and Sexual Activity     Alcohol use: Yes     Comment: 1 -2 drinks a week     Drug use: Never     Sexual activity: Yes     Partners: Male     Birth control/protection: I.U.D.   Other Topics Concern     Parent/sibling w/ CABG, MI or angioplasty before 65F 55M? No   Social History Narrative     Not on file     Social Determinants of Health     Financial Resource Strain: Low Risk  (4/15/2024)    Financial Resource Strain      Within the past 12 months, have you or your family members you live with been unable to get utilities (heat,  electricity) when it was really needed?: No   Food Insecurity: Low Risk  (4/15/2024)    Food Insecurity      Within the past 12 months, did you worry that your food would run out before you got money to buy more?: No      Within the past 12 months, did the food you bought just not last and you didn t have money to get more?: No   Transportation Needs: Low Risk  (4/15/2024)    Transportation Needs      Within the past 12 months, has lack of transportation kept you from medical appointments, getting your medicines, non-medical meetings or appointments, work, or from getting things that you need?: No   Physical Activity: Sufficiently Active (4/15/2024)    Exercise Vital Sign      Days of Exercise per Week: 7 days      Minutes of Exercise per Session: 40 min   Stress: No Stress Concern Present (4/15/2024)    [employer] [place] [employer] - Occupational Stress Questionnaire      Feeling of Stress : Only a little   Social Connections: Unknown (4/15/2024)    Social Connection and Isolation Panel [NHANES]      Frequency of Communication with Friends and Family: Not on file      Frequency of Social Gatherings with Friends and Family: Twice a week      Attends Gnosticist Services: Not on file      Active Member of Clubs or Organizations: Not on file      Attends Club or Organization Meetings: Not on file      Marital Status: Not on file   Interpersonal Safety: Low Risk  (4/17/2024)    Interpersonal Safety      Do you feel physically and emotionally safe where you currently live?: Yes      Within the past 12 months, have you been hit, slapped, kicked or otherwise physically hurt by someone?: No      Within the past 12 months, have you been humiliated or emotionally abused in other ways by your partner or ex-partner?: No   Housing Stability: Low Risk  (4/15/2024)    Housing Stability      Do you have housing? : Yes      Are you worried about losing your housing?: No       Outpatient Encounter Medications as of  8/5/2024   Medication Sig Dispense Refill     nystatin (MYCOSTATIN) 304288 UNIT/GM external ointment Apply topically 2 times daily 15 g 0     triamcinolone (KENALOG) 0.1 % external ointment Apply topically 2 times daily 15 g 0     No facility-administered encounter medications on file as of 8/5/2024.             Review Of Systems  Skin: As above  Eyes: negative  Ears/Nose/Throat: negative  Respiratory: No shortness of breath, dyspnea on exertion, cough, or hemoptysis  Cardiovascular: negative  Gastrointestinal: negative  Genitourinary: negative  Musculoskeletal: negative  Neurologic: negative  Psychiatric: negative  Hematologic/Lymphatic/Immunologic: negative  Endocrine: negative      O:   NAD, WDWN, Alert & Oriented, Mood & Affect wnl, Vitals stable   General appearance salazar ii   Vitals stable   Alert, oriented and in no acute distress   Depigmented patches on hands and penis   No visible rash on penis      Eyes: Conjunctivae/lids:Normal     ENT: Lips, mucosa: normal    MSK:Normal    Cardiovascular: peripheral edema none    Pulm: Breathing Normal    Neuro/Psych: Orientation:Normal; Mood/Affect:Normal      A/P:  Vitiligo, pain with urination   Options for vitiligo discussed with patient   Pain with urination I would encourage follow up with Urology  Skin care discussed with patient   If rash occurs on penis mychart us   It was a pleasure speaking to Leonardo Pace today.  Previous clinic  notes and pertinent laboratory tests were reviewed prior to Leonardo Pace's visit.  Skin care regimen reviewed with patient: Eliminate harsh soaps, i.e. Dial, zest, irsih spring; Mild soaps such as Cetaphil or Dove sensitive skin, avoid hot or cold showers, aggressive use of emollients including vanicream, cetaphil or cerave discussed with patient.        Again, thank you for allowing me to participate in the care of your patient.        Sincerely,        Darian Conti MD

## 2024-08-05 NOTE — PATIENT INSTRUCTIONS
You can try putting Desitin on your penis at bedtime to help protect the skin.    It is up to you if you'd like to keep using the Triamcinolone (for vitiligo) and Nystatin.     Keep your appointment with Urology.    Send a MyChart message with any concerns.    Proper skin care from Dr. Conti- [facility]     Eliminate harsh soaps, i.e. Dial, Zest, Greek Spring;   Use mild soaps such as Cetaphil or Dove Sensitive Skin   Avoid hot or cold showers   After showering, lightly dry off.    Aggressive use of a moisturizer (including Vanicream, Cetaphil, Aquaphor or Cerave)   We recommend using a tub that needs to be scooped out, not a pump. This has more of an oil base. It will hold moisture in your skin much better than a water base moisturizer. The ones recommended are non- pore clogging.       If you have any questions call [phone] and follow the prompts to Dr. Conti's office.

## 2024-08-27 ENCOUNTER — OFFICE VISIT (OUTPATIENT)
Dept: UROLOGY | Facility: CLINIC | Age: 34
End: 2024-08-27
Attending: FAMILY MEDICINE
Payer: COMMERCIAL

## 2024-08-27 VITALS — OXYGEN SATURATION: 98 % | SYSTOLIC BLOOD PRESSURE: 133 MMHG | HEART RATE: 79 BPM | DIASTOLIC BLOOD PRESSURE: 89 MMHG

## 2024-08-27 DIAGNOSIS — N48.89 PENILE IRRITATION: ICD-10-CM

## 2024-08-27 LAB
ALBUMIN UR-MCNC: NEGATIVE MG/DL
APPEARANCE UR: CLEAR
BILIRUB UR QL STRIP: NEGATIVE
COLOR UR AUTO: YELLOW
GLUCOSE UR STRIP-MCNC: NEGATIVE MG/DL
HGB UR QL STRIP: ABNORMAL
KETONES UR STRIP-MCNC: NEGATIVE MG/DL
LEUKOCYTE ESTERASE UR QL STRIP: NEGATIVE
NITRATE UR QL: NEGATIVE
PH UR STRIP: 5.5 [PH] (ref 5–7)
RBC #/AREA URNS AUTO: NORMAL /HPF
SP GR UR STRIP: 1.02 (ref 1–1.03)
UROBILINOGEN UR STRIP-ACNC: 0.2 E.U./DL
WBC #/AREA URNS AUTO: NORMAL /HPF

## 2024-08-27 PROCEDURE — 99243 OFF/OP CNSLTJ NEW/EST LOW 30: CPT | Performed by: UROLOGY

## 2024-08-27 PROCEDURE — 87563 M. GENITALIUM AMP PROBE: CPT | Mod: 90 | Performed by: UROLOGY

## 2024-08-27 PROCEDURE — 81001 URINALYSIS AUTO W/SCOPE: CPT | Performed by: UROLOGY

## 2024-08-27 PROCEDURE — 99000 SPECIMEN HANDLING OFFICE-LAB: CPT | Performed by: UROLOGY

## 2024-08-27 PROCEDURE — 87798 DETECT AGENT NOS DNA AMP: CPT | Mod: 90 | Performed by: UROLOGY

## 2024-08-27 NOTE — PROGRESS NOTES
S: Patient is a pleasant 54-year-old  male who was requested to be seen by Dr. Lara for a consultation with regard to patient's intermittent penile irritation.  Patient complains of some irritation to the tip of his penis off and on for last several months.  He has no penile discharge.  He has no hematuria.  In May of this year he was seen for virtual visit and was diagnosed with balanitis.  He was prescribed antifungal cream which does help with the symptoms.  In August of this year he has similar complaints.  He was seen at urgent care.   URinalysis at that time showed yeast therefore he was diagnosed with yeast UTI and was placed on Diflucan for 3 days. Since then he has intermittent symptoms only.   He does not have diabetes.  He is not immunocompromise.  He is  circumcised.  Current Outpatient Medications   Medication Sig Dispense Refill    nystatin (MYCOSTATIN) 285320 UNIT/GM external ointment Apply topically 2 times daily (Patient not taking: Reported on 8/27/2024) 15 g 0    triamcinolone (KENALOG) 0.1 % external ointment Apply topically 2 times daily (Patient not taking: Reported on 8/27/2024) 15 g 0     Allergies   Allergen Reactions    Coffee Flavor      Coffee causes constipation/Coffee Bean Causes reaction      No past medical history on file.  No past surgical history on file.   Family History   Problem Relation Age of Onset    Cerebrovascular Disease Mother     Cerebrovascular Disease Father         Due to an accident     Social History     Socioeconomic History    Marital status:      Spouse name: None    Number of children: None    Years of education: None    Highest education level: None   Tobacco Use    Smoking status: Every Day     Current packs/day: 0.50     Average packs/day: 0.5 packs/day for 5.0 years (2.5 ttl pk-yrs)     Types: Other, Cigarettes    Smokeless tobacco: Never   Vaping Use    Vaping status: Every Day    Substances: Nicotine    Devices: RefZooomrble tank    Substance and Sexual Activity    Alcohol use: Yes     Comment: 1 -2 drinks a week    Drug use: Never    Sexual activity: Yes     Partners: Male     Birth control/protection: I.U.D.   Other Topics Concern    Parent/sibling w/ CABG, MI or angioplasty before 65F 55M? No     Social Determinants of Health     Financial Resource Strain: Low Risk  (4/15/2024)    Financial Resource Strain     Within the past 12 months, have you or your family members you live with been unable to get utilities (heat, electricity) when it was really needed?: No   Food Insecurity: Low Risk  (4/15/2024)    Food Insecurity     Within the past 12 months, did you worry that your food would run out before you got money to buy more?: No     Within the past 12 months, did the food you bought just not last and you didn t have money to get more?: No   Transportation Needs: Low Risk  (4/15/2024)    Transportation Needs     Within the past 12 months, has lack of transportation kept you from medical appointments, getting your medicines, non-medical meetings or appointments, work, or from getting things that you need?: No   Physical Activity: Sufficiently Active (4/15/2024)    Exercise Vital Sign     Days of Exercise per Week: 7 days     Minutes of Exercise per Session: 40 min   Stress: No Stress Concern Present (4/15/2024)    [employer] [employer] - Occupational Stress Questionnaire     Feeling of Stress : Only a little   Social Connections: Unknown (4/15/2024)    Social Connection and Isolation Panel [NHANES]     Frequency of Social Gatherings with Friends and Family: Twice a week   Interpersonal Safety: Low Risk  (4/17/2024)    Interpersonal Safety     Do you feel physically and emotionally safe where you currently live?: Yes     Within the past 12 months, have you been hit, slapped, kicked or otherwise physically hurt by someone?: No     Within the past 12 months, have you been humiliated or emotionally abused in other ways by your  partner or ex-partner?: No   Housing Stability: Low Risk  (4/15/2024)    Housing Stability     Do you have housing? : Yes     Are you worried about losing your housing?: No       REVIEW OF SYSTEMS  =================  C: NEGATIVE for fever, chills, change in weight  I: NEGATIVE for worrisome rashes, moles or lesions  E/M: NEGATIVE for ear, mouth and throat problems  R: NEGATIVE for significant cough or SHORTNESS OF BREATH  CV:  NEGATIVE for chest pain, palpitations or peripheral edema  GI: NEGATIVE for nausea, abdominal pain, heartburn, or change in bowel habits  NEURO: NEGATIVE numbness/weakness  : see HPI  PSYCH: NEGATIVE depression/anxiety  LYmph: no new enlarged lymph nodes  Ortho: no new trauma/movements      Physical Exam:  /89   Pulse 79   SpO2 98%    Patient is pleasant, in no acute distress, good general condition.  Heart:  negative, PMI normal  Lung: no evidence of respiratory distress    Abdomen: Soft, nondistended, non tender. No masses. No rebound or guarding.   Exam: Penis circumcised without any penile discharges.  Testes no masses.  No scrotal skin lesion.  Skin: Warm and dry.  No redness.  Neuro: grossly normal  Musculaskeletal: moving all extremities  Psych normal mood and affect  Musculoskeletal  moving all extremities  Hematologic/Lymphatic/Immunologic: normal ant/post cervical, axillary, supraclavicular and inguinal nodes    Assessment/Plan:     Pleasant 34-year-old male with intermittent penile irritation.  There is no obvious evidence of balanitis or yeast UTI.  Symptoms could be due to anxiety-stress.  Will send urine for mycoplasma culture.  Patient was reassured.

## 2024-08-29 LAB
M GENITALIUM DNA SPEC QL NAA+PROBE: NOT DETECTED
M HOMINIS DNA SPEC QL NAA+PROBE: NOT DETECTED
U PARVUM DNA SPEC QL NAA+PROBE: NOT DETECTED
U UREALYTICUM DNA SPEC QL NAA+PROBE: NOT DETECTED

## 2025-03-18 ENCOUNTER — PATIENT OUTREACH (OUTPATIENT)
Dept: CARE COORDINATION | Facility: CLINIC | Age: 35
End: 2025-03-18
Payer: COMMERCIAL

## 2025-04-01 ENCOUNTER — PATIENT OUTREACH (OUTPATIENT)
Dept: CARE COORDINATION | Facility: CLINIC | Age: 35
End: 2025-04-01
Payer: COMMERCIAL

## 2025-05-08 ENCOUNTER — OFFICE VISIT (OUTPATIENT)
Dept: FAMILY MEDICINE | Facility: CLINIC | Age: 35
End: 2025-05-08
Payer: COMMERCIAL

## 2025-05-08 VITALS
OXYGEN SATURATION: 97 % | HEART RATE: 70 BPM | DIASTOLIC BLOOD PRESSURE: 83 MMHG | RESPIRATION RATE: 14 BRPM | HEIGHT: 71 IN | SYSTOLIC BLOOD PRESSURE: 126 MMHG | TEMPERATURE: 98.2 F | WEIGHT: 208 LBS | BODY MASS INDEX: 29.12 KG/M2

## 2025-05-08 DIAGNOSIS — L05.91 PILONIDAL CYST: Primary | ICD-10-CM

## 2025-05-08 DIAGNOSIS — H69.92 DYSFUNCTION OF LEFT EUSTACHIAN TUBE: ICD-10-CM

## 2025-05-08 DIAGNOSIS — L25.9 CONTACT DERMATITIS, UNSPECIFIED CONTACT DERMATITIS TYPE, UNSPECIFIED TRIGGER: ICD-10-CM

## 2025-05-08 ASSESSMENT — PAIN SCALES - GENERAL: PAINLEVEL_OUTOF10: NO PAIN (0)

## 2025-05-08 NOTE — PATIENT INSTRUCTIONS
No treatment needed right now    Do schedule with colorectal specialist    Keep working on healthy diet/exercise    Monitor ear symptoms

## 2025-05-08 NOTE — PROGRESS NOTES
"  Assessment & Plan     (L05.91) Pilonidal cyst  (primary encounter diagnosis)  Comment: no treatment needed right now.  Not swollen.  Patient should see specialist as condition has been acting up more.   Plan: Adult Colorectal Surgery  Referral             (L25.9) Contact dermatitis, unspecified contact dermatitis type, unspecified trigger  Comment: this is much better per patient   Plan: follow up prn     (H69.92) Dysfunction of left eustachian tube  Comment: discussed in detail.  No prescription needed.  Much better today than yesterday.  Plan: monitor. Could try sudafed prn.           Nicotine/Tobacco Cessation  He reports that he has been smoking other and cigarettes. He has a 2.5 pack-year smoking history. He has been exposed to tobacco smoke. He has never used smokeless tobacco.  Nicotine/Tobacco Cessation Plan  Did not get to this topic today       BMI  Estimated body mass index is 29.42 kg/m  as calculated from the following:    Height as of this encounter: 1.791 m (5' 10.5\").    Weight as of this encounter: 94.3 kg (208 lb).             Maryann Bell is a 35 year old, presenting for the following health issues:  Pilonidal cyst for 10 years (Breaking open every 10 to 14 days now and not healing properly) and Ear Problem (Left ear had some fullness feeling in it last Tuesday.)      5/8/2025    10:49 AM   Additional Questions   Roomed by cam   Accompanied by none     Ear Problem    History of Present Illness       Reason for visit:  Pilonidal cyst  Symptom onset:  More than a month   He is taking medications regularly.       10 years    Saw specialist at some point     About 3 months ago burst open, lots of blood came out that time  That had not happened and has not happened since     When it releases, usually both pus and blood     Bleeds for 4-6 hours     Upper buttock fold area    No history of hemorrhoids    Had contact dermatitis on penis, has seen specialists    Much better than it was " "    Anxiety but manageable    Some ear issues recently on left, no pain       Objective    /83 (BP Location: Right arm, Patient Position: Chair, Cuff Size: Adult Regular)   Pulse 70   Temp 98.2  F (36.8  C) (Temporal)   Resp 14   Ht 1.791 m (5' 10.5\")   Wt 94.3 kg (208 lb)   SpO2 97%   BMI 29.42 kg/m    Body mass index is 29.42 kg/m .  Physical Exam    Full physical not done     Mentation and affect are fine    No tremor of speech or extremity    Both ear canals normal    The left tympanic membrane looks a bit dull but still has light reflex present  Not bulging   No redness    Right tympanic membrane normal    Nasal and oral mucosa normal    No sinus / submandib tenderness    Patient has small pea sized lump just right of midline in upper gluteal fold area  No other abnormalities in gluteal/ anal area    The bump is just slightly tender    No drainage                 Signed Electronically by: Heriberto Vivas MD    "

## 2025-05-17 ENCOUNTER — HEALTH MAINTENANCE LETTER (OUTPATIENT)
Age: 35
End: 2025-05-17

## 2025-05-25 NOTE — PROGRESS NOTES
"Colon and Rectal Surgery Clinic Note    RE: Leonardo Pace.  : 1990.  CHUNG: 6/3/2025.    Reason for visit: pilonidal cyst.    HPI: Leonardo Pace is a 35 year old male who presents today for a pilonidal cyst. He has a no significant past medical history. He presented to his PCP's office in May for intermittent drainage of his pilonidal cyst.     Today Ray feels well. He has a bike trip in September. He reports swelling and drainage quite often.    Medical history:  -none     Surgical history:  -none     Family history:  Family History   Problem Relation Age of Onset    Cerebrovascular Disease Mother     Cerebrovascular Disease Father         Due to an accident     Medications:  No current outpatient medications on file.     Allergies:  Allergies   Allergen Reactions    Coffee Flavoring Agent (Non-Screening)      Coffee causes constipation/Coffee Bean Causes reaction      Social history:   Social History     Tobacco Use    Smoking status: Former     Current packs/day: 0.50     Average packs/day: 0.5 packs/day for 5.0 years (2.5 ttl pk-yrs)     Types: Other, Cigarettes     Passive exposure: Current    Smokeless tobacco: Never    Tobacco comments:     \"On day 18 of quitting\"    Substance Use Topics    Alcohol use: Yes     Comment: 1 -2 drinks a week     Marital status: .    ROS:  A complete review of systems was performed with the patient and all systems negative except as per HPI.    Physical Examination:  BP (!) 151/89 (BP Location: Left arm, Patient Position: Sitting, Cuff Size: Adult Regular)   Pulse 72   Ht 1.791 m (5' 10.5\")   Wt 95.5 kg (210 lb 9.6 oz)   SpO2 99%   BMI 29.79 kg/m    General: Well hydrated. No acute distress.  CV: RRR  Lung: Non-labored breathing on RA  Abdomen: Soft, NT.  Perianal external examination:  Pilonidal cyst to the right of the midline with one single midline pit.      ASSESSMENT    This is a 35 year old M with a symptomatic pilonidal cyst. We discussed " lay open and wound healing. They asked appropriate questions, which were answered. Risks, benefits, and alternatives of operative treatment were thoroughly discussed with the patient, he understands these well and agrees to proceed.    All pertinent labs and imaging were personally reviewed by me.      PLAN  - To OR for EUA and lay open of pilonidal cyst  - Preop teaching and eval  - Healthy life style and weight loss were encouraged       30 minutes spent on the date of the encounter doing chart review, history and exam, imaging review, documentation and further activities as noted above.      Venkatesh Dumont MD, FACS, FASCRS, FSSO    Division of Colon and Rectal Surgery  [facility]    Referring Provider:  Heriberto Vivas MD  [address] [place] [address]     Primary Care Provider:  Olivia Lara

## 2025-06-03 ENCOUNTER — OFFICE VISIT (OUTPATIENT)
Dept: SURGERY | Facility: CLINIC | Age: 35
End: 2025-06-03
Payer: COMMERCIAL

## 2025-06-03 VITALS
SYSTOLIC BLOOD PRESSURE: 151 MMHG | OXYGEN SATURATION: 99 % | DIASTOLIC BLOOD PRESSURE: 89 MMHG | HEIGHT: 71 IN | BODY MASS INDEX: 29.48 KG/M2 | HEART RATE: 72 BPM | WEIGHT: 210.6 LBS

## 2025-06-03 DIAGNOSIS — L05.91 PILONIDAL CYST: Primary | ICD-10-CM

## 2025-06-03 DIAGNOSIS — E66.3 OVERWEIGHT (BMI 25.0-29.9): ICD-10-CM

## 2025-06-03 PROCEDURE — 3079F DIAST BP 80-89 MM HG: CPT | Performed by: SURGERY

## 2025-06-03 PROCEDURE — 99203 OFFICE O/P NEW LOW 30 MIN: CPT | Performed by: SURGERY

## 2025-06-03 PROCEDURE — 1125F AMNT PAIN NOTED PAIN PRSNT: CPT | Performed by: SURGERY

## 2025-06-03 PROCEDURE — 3077F SYST BP >= 140 MM HG: CPT | Performed by: SURGERY

## 2025-06-03 ASSESSMENT — PAIN SCALES - GENERAL: PAINLEVEL_OUTOF10: MILD PAIN (1)

## 2025-06-03 NOTE — PATIENT INSTRUCTIONS
Follow up:    Scheduling will give you a call within three business days to schedule surgery    Appointment you will need in prep: pre op physical with our anesthesia team or your PCP    WAQAR Martinez [phone]    Clinic Fax Number [phone]    Surgery Scheduling [phone]    My Chart is available 24 hours a day and is a secure way to access your records and communicate with your care team.  I strongly recommend signing up if you haven't already done so, if you are comfortable with computers.  If you would like to inquire about this or are having problems with My Chart access, you may call [phone] or go online at [email or web address].  Please allow at least 24 hours for a response and extra time on weekends and Holidays.

## 2025-06-03 NOTE — LETTER
"6/3/2025       RE: Leonardo Pace  [address] [facility] [address]     Dear Colleague,    Thank you for referring your patient, Leonardo Pace, to the [facility]ON AND [facility]ERY [facility]INIC [place] [facility]. Please see a copy of my visit note below.    Colon and Rectal Surgery Clinic Note    RE: Leonardo Pace.  : 1990.  CHUNG: 6/3/2025.    Reason for visit: pilonidal cyst.    HPI: Leonardo Pace is a 35 year old male who presents today for a pilonidal cyst. He has a no significant past medical history. He presented to his PCP's office in May for intermittent drainage of his pilonidal cyst.     Today Ray feels well. He has a bike trip in September. He reports swelling and drainage quite often.    Medical history:  -none     Surgical history:  -none     Family history:  Family History   Problem Relation Age of Onset     Cerebrovascular Disease Mother      Cerebrovascular Disease Father         Due to an accident     Medications:  No current outpatient medications on file.     Allergies:  Allergies   Allergen Reactions     Coffee Flavoring Agent (Non-Screening)      Coffee causes constipation/Coffee Bean Causes reaction      Social history:   Social History     Tobacco Use     Smoking status: Former     Current packs/day: 0.50     Average packs/day: 0.5 packs/day for 5.0 years (2.5 ttl pk-yrs)     Types: Other, Cigarettes     Passive exposure: Current     Smokeless tobacco: Never     Tobacco comments:     \"On day 18 of quitting\"    Substance Use Topics     Alcohol use: Yes     Comment: 1 -2 drinks a week     Marital status: .    ROS:  A complete review of systems was performed with the patient and all systems negative except as per HPI.    Physical Examination:  BP (!) 151/89 (BP Location: Left arm, Patient Position: Sitting, Cuff Size: Adult Regular)   Pulse 72   Ht 1.791 m (5' 10.5\")   Wt " 95.5 kg (210 lb 9.6 oz)   SpO2 99%   BMI 29.79 kg/m    General: Well hydrated. No acute distress.  CV: RRR  Lung: Non-labored breathing on RA  Abdomen: Soft, NT.  Perianal external examination:  Pilonidal cyst to the right of the midline with one single midline pit.      ASSESSMENT    This is a 35 year old M with a symptomatic pilonidal cyst. We discussed lay open and wound healing. They asked appropriate questions, which were answered. Risks, benefits, and alternatives of operative treatment were thoroughly discussed with the patient, he understands these well and agrees to proceed.    All pertinent labs and imaging were personally reviewed by me.      PLAN  - To OR for EUA and lay open of pilonidal cyst  - Preop teaching and eval  - Healthy life style and weight loss were encouraged       30 minutes spent on the date of the encounter doing chart review, history and exam, imaging review, documentation and further activities as noted above.      Venkatesh Dumont MD, FACS, FASCRS, FSSO    Division of Colon and Rectal Surgery  [facility]    Referring Provider:  Heriberto Vivas MD  [address] [place] [address]     Primary Care Provider:  Olivia Lara      Again, thank you for allowing me to participate in the care of your patient.      Sincerely,    Venkatesh Dumont MD     16

## 2025-06-03 NOTE — NURSING NOTE
"Chief Complaint   Patient presents with    Consult     Pilonidal cyst       Vitals:    06/03/25 0906   BP: (!) 151/89   BP Location: Left arm   Patient Position: Sitting   Cuff Size: Adult Regular   Pulse: 72   SpO2: 99%   Weight: 210 lb 9.6 oz   Height: 5' 10.5\"       Body mass index is 29.79 kg/m .    Ale Sanchez, EMT  "

## 2025-06-04 ENCOUNTER — TELEPHONE (OUTPATIENT)
Dept: SURGERY | Facility: CLINIC | Age: 35
End: 2025-06-04
Payer: COMMERCIAL

## 2025-06-04 NOTE — TELEPHONE ENCOUNTER
Voicemail message received from patient requesting a return call regarding scheduling surgery.    Nikki garcia on 6/4/2025 at 9:02 AM

## 2025-06-04 NOTE — TELEPHONE ENCOUNTER
Left message to schedule surgery with Dr. Dumont.   Writer left call back number [phone] on the patients voicemail.     Jena Mendoza on 6/4/2025 at 5:01 PM

## 2025-06-18 ENCOUNTER — TELEPHONE (OUTPATIENT)
Dept: SURGERY | Facility: CLINIC | Age: 35
End: 2025-06-18
Payer: COMMERCIAL

## 2025-06-18 NOTE — TELEPHONE ENCOUNTER
Patient called to cancel his outpatient surgery with Dr. Venkatesh Dumont scheduled on 7/2/2025 as well as related appointments.    Per patient, he will have better health insurance starting sometime in October, so he will call back to reschedule surgery in Fall/Winter of 2025/early 2026.    Surgery DOS 7/2/2025 and related appts canceled. Awaiting return call from patient to reschedule surgery as stated above.    Call back number provided: [phone]    Nikki garcia on 6/18/2025 at 9:34 AM